# Patient Record
Sex: FEMALE | Race: BLACK OR AFRICAN AMERICAN | NOT HISPANIC OR LATINO | Employment: FULL TIME | URBAN - METROPOLITAN AREA
[De-identification: names, ages, dates, MRNs, and addresses within clinical notes are randomized per-mention and may not be internally consistent; named-entity substitution may affect disease eponyms.]

---

## 2018-04-18 ENCOUNTER — HISTORICAL (OUTPATIENT)
Dept: ADMINISTRATIVE | Facility: HOSPITAL | Age: 38
End: 2018-04-18

## 2018-04-18 LAB
BASOPHILS NFR BLD MANUAL: 2 %
BILIRUB SERPL-MCNC: NEGATIVE MG/DL
BLOOD URINE, POC: NEGATIVE
BUN SERPL-MCNC: 6 MG/DL (ref 7–18)
CALCIUM SERPL-MCNC: 8.7 MG/DL (ref 8.5–10.1)
CHLORIDE SERPL-SCNC: 105 MMOL/L (ref 98–107)
CLARITY, POC UA: CLEAR
CO2 SERPL-SCNC: 26 MMOL/L (ref 21–32)
COLOR, POC UA: YELLOW
CREAT SERPL-MCNC: 0.4 MG/DL (ref 0.6–1.3)
CREAT/UREA NIT SERPL: 15
EOSINOPHIL NFR BLD MANUAL: 1 %
ERYTHROCYTE [DISTWIDTH] IN BLOOD BY AUTOMATED COUNT: 12.3 % (ref 11.5–14.5)
GLUCOSE SERPL-MCNC: 89 MG/DL (ref 74–106)
GLUCOSE UR QL STRIP: NEGATIVE
GRANULOCYTES NFR BLD MANUAL: 63 % (ref 43–75)
HBV SURFACE AG SERPL QL IA: NEGATIVE
HCT VFR BLD AUTO: 36.8 % (ref 35–46)
HCV AB SERPL QL IA: NONREACTIVE
HGB BLD-MCNC: 12.5 GM/DL (ref 12–16)
HIV 1+2 AB+HIV1 P24 AG SERPL QL IA: NONREACTIVE
KETONES UR QL STRIP: NEGATIVE
LEUKOCYTE EST, POC UA: NEGATIVE
LYMPHOCYTES NFR BLD MANUAL: 17 % (ref 20.5–51.1)
LYMPHOCYTES NFR BLD MANUAL: 2 %
MCH RBC QN AUTO: 29.5 PG (ref 26–34)
MCHC RBC AUTO-ENTMCNC: 34 GM/DL (ref 31–37)
MCV RBC AUTO: 86.8 FL (ref 80–100)
METAMYELOCYTES NFR BLD MANUAL: 3 %
MONOCYTES NFR BLD MANUAL: 7 % (ref 2–9)
NEUTS BAND NFR BLD MANUAL: 5 % (ref 0–10)
NITRITE, POC UA: NEGATIVE
PH, POC UA: 7
PLATELET # BLD AUTO: 366 X10(3)/MCL (ref 130–400)
PLATELET # BLD EST: ADEQUATE 10*3/UL
PMV BLD AUTO: 10.1 FL (ref 7.4–10.4)
POLYCHROMASIA BLD QL SMEAR: ABNORMAL
POTASSIUM SERPL-SCNC: 3.9 MMOL/L (ref 3.5–5.1)
PROTEIN, POC: NEGATIVE
RBC # BLD AUTO: 4.24 X10(6)/MCL (ref 4–5.2)
RBC MORPH BLD: ABNORMAL
SODIUM SERPL-SCNC: 140 MMOL/L (ref 136–145)
SPECIFIC GRAVITY, POC UA: 1.01
T PALLIDUM AB SER QL: NONREACTIVE
UROBILINOGEN, POC UA: NORMAL
WBC # SPEC AUTO: 18.3 X10(3)/MCL (ref 4.5–11)

## 2018-08-05 ENCOUNTER — HOSPITAL ENCOUNTER (OUTPATIENT)
Dept: OBSTETRICS AND GYNECOLOGY | Facility: HOSPITAL | Age: 38
End: 2018-08-05
Attending: OBSTETRICS & GYNECOLOGY | Admitting: OBSTETRICS & GYNECOLOGY

## 2022-04-10 ENCOUNTER — HISTORICAL (OUTPATIENT)
Dept: ADMINISTRATIVE | Facility: HOSPITAL | Age: 42
End: 2022-04-10
Payer: MEDICAID

## 2022-04-24 VITALS
BODY MASS INDEX: 21.51 KG/M2 | SYSTOLIC BLOOD PRESSURE: 112 MMHG | DIASTOLIC BLOOD PRESSURE: 76 MMHG | HEIGHT: 62 IN | OXYGEN SATURATION: 98 % | WEIGHT: 116.88 LBS

## 2022-10-11 ENCOUNTER — OFFICE VISIT (OUTPATIENT)
Dept: FAMILY MEDICINE | Facility: CLINIC | Age: 42
End: 2022-10-11
Payer: MEDICAID

## 2022-10-11 VITALS
BODY MASS INDEX: 24.75 KG/M2 | WEIGHT: 136.19 LBS | OXYGEN SATURATION: 100 % | DIASTOLIC BLOOD PRESSURE: 65 MMHG | SYSTOLIC BLOOD PRESSURE: 102 MMHG | RESPIRATION RATE: 18 BRPM | HEART RATE: 102 BPM

## 2022-10-11 DIAGNOSIS — Z30.46 ENCOUNTER FOR NEXPLANON REMOVAL: Primary | ICD-10-CM

## 2022-10-11 DIAGNOSIS — N92.6 IRREGULAR BLEEDING: ICD-10-CM

## 2022-10-11 DIAGNOSIS — Z12.39 ENCOUNTER FOR SCREENING FOR MALIGNANT NEOPLASM OF BREAST, UNSPECIFIED SCREENING MODALITY: ICD-10-CM

## 2022-10-11 DIAGNOSIS — Z00.00 HEALTH CARE MAINTENANCE: ICD-10-CM

## 2022-10-11 PROBLEM — N93.9 ABNORMAL UTERINE BLEEDING: Status: ACTIVE | Noted: 2022-09-23

## 2022-10-11 LAB
ALBUMIN SERPL-MCNC: 3.9 GM/DL (ref 3.5–5)
ALBUMIN/GLOB SERPL: 1 RATIO (ref 1.1–2)
ALP SERPL-CCNC: 74 UNIT/L (ref 40–150)
ALT SERPL-CCNC: 28 UNIT/L (ref 0–55)
AST SERPL-CCNC: 19 UNIT/L (ref 5–34)
B-HCG UR QL: NEGATIVE
BASOPHILS # BLD AUTO: 0.1 X10(3)/MCL (ref 0–0.2)
BASOPHILS NFR BLD AUTO: 1 %
BILIRUBIN DIRECT+TOT PNL SERPL-MCNC: 0.4 MG/DL
BUN SERPL-MCNC: 12.9 MG/DL (ref 7–18.7)
CALCIUM SERPL-MCNC: 9.2 MG/DL (ref 8.4–10.2)
CHLORIDE SERPL-SCNC: 107 MMOL/L (ref 98–107)
CHOLEST SERPL-MCNC: 130 MG/DL
CHOLEST/HDLC SERPL: 3 {RATIO} (ref 0–5)
CO2 SERPL-SCNC: 28 MMOL/L (ref 22–29)
CREAT SERPL-MCNC: 0.81 MG/DL (ref 0.55–1.02)
CTP QC/QA: YES
EOSINOPHIL # BLD AUTO: 0.35 X10(3)/MCL (ref 0–0.9)
EOSINOPHIL NFR BLD AUTO: 3.6 %
ERYTHROCYTE [DISTWIDTH] IN BLOOD BY AUTOMATED COUNT: 11.9 % (ref 11.5–17)
EST. AVERAGE GLUCOSE BLD GHB EST-MCNC: 93.9 MG/DL
GFR SERPLBLD CREATININE-BSD FMLA CKD-EPI: >60 MLS/MIN/1.73/M2
GLOBULIN SER-MCNC: 3.8 GM/DL (ref 2.4–3.5)
GLUCOSE SERPL-MCNC: 89 MG/DL (ref 74–100)
HBA1C MFR BLD: 4.9 %
HCT VFR BLD AUTO: 43 % (ref 37–47)
HDLC SERPL-MCNC: 43 MG/DL (ref 35–60)
HGB BLD-MCNC: 14.8 GM/DL (ref 12–16)
IMM GRANULOCYTES # BLD AUTO: 0.04 X10(3)/MCL (ref 0–0.04)
IMM GRANULOCYTES NFR BLD AUTO: 0.4 %
LDLC SERPL CALC-MCNC: 73 MG/DL (ref 50–140)
LYMPHOCYTES # BLD AUTO: 3.55 X10(3)/MCL (ref 0.6–4.6)
LYMPHOCYTES NFR BLD AUTO: 36.4 %
MCH RBC QN AUTO: 30.2 PG (ref 27–31)
MCHC RBC AUTO-ENTMCNC: 34.4 MG/DL (ref 33–36)
MCV RBC AUTO: 87.8 FL (ref 80–94)
MONOCYTES # BLD AUTO: 1.07 X10(3)/MCL (ref 0.1–1.3)
MONOCYTES NFR BLD AUTO: 11 %
NEUTROPHILS # BLD AUTO: 4.6 X10(3)/MCL (ref 2.1–9.2)
NEUTROPHILS NFR BLD AUTO: 47.6 %
NRBC BLD AUTO-RTO: 0 %
PLATELET # BLD AUTO: 420 X10(3)/MCL (ref 130–400)
PMV BLD AUTO: 9.7 FL (ref 7.4–10.4)
POTASSIUM SERPL-SCNC: 3.8 MMOL/L (ref 3.5–5.1)
PROT SERPL-MCNC: 7.7 GM/DL (ref 6.4–8.3)
RBC # BLD AUTO: 4.9 X10(6)/MCL (ref 4.2–5.4)
SODIUM SERPL-SCNC: 142 MMOL/L (ref 136–145)
TRIGL SERPL-MCNC: 68 MG/DL (ref 37–140)
TSH SERPL-ACNC: 0.89 UIU/ML (ref 0.35–4.94)
VLDLC SERPL CALC-MCNC: 14 MG/DL
WBC # SPEC AUTO: 9.8 X10(3)/MCL (ref 4.5–11.5)

## 2022-10-11 PROCEDURE — 84443 ASSAY THYROID STIM HORMONE: CPT

## 2022-10-11 PROCEDURE — 90471 IMMUNIZATION ADMIN: CPT | Mod: PBBFAC

## 2022-10-11 PROCEDURE — 11982 REMOVE DRUG IMPLANT DEVICE: CPT | Mod: PBBFAC | Performed by: STUDENT IN AN ORGANIZED HEALTH CARE EDUCATION/TRAINING PROGRAM

## 2022-10-11 PROCEDURE — 83036 HEMOGLOBIN GLYCOSYLATED A1C: CPT

## 2022-10-11 PROCEDURE — 80053 COMPREHEN METABOLIC PANEL: CPT

## 2022-10-11 PROCEDURE — 80061 LIPID PANEL: CPT

## 2022-10-11 PROCEDURE — 36415 COLL VENOUS BLD VENIPUNCTURE: CPT

## 2022-10-11 PROCEDURE — 85025 COMPLETE CBC W/AUTO DIFF WBC: CPT

## 2022-10-11 PROCEDURE — 81025 URINE PREGNANCY TEST: CPT | Mod: PBBFAC

## 2022-10-11 PROCEDURE — 99213 OFFICE O/P EST LOW 20 MIN: CPT | Mod: PBBFAC

## 2022-10-11 RX ORDER — LIDOCAINE HYDROCHLORIDE 10 MG/ML
5 INJECTION, SOLUTION EPIDURAL; INFILTRATION; INTRACAUDAL; PERINEURAL
Status: ACTIVE | OUTPATIENT
Start: 2022-10-11

## 2022-10-11 NOTE — PROGRESS NOTES
[facility] Visit Note  [facility]      Subjective:      Yas Soto is a 42 y.o. female who is presenting for Nexplanon removal.  Pt has had irregular bleeding for approx 1-2 years.     Would like removal of Nexplanon 2/2 should've been removed in 10/21 and has been having irregular bleeding for approx 1-2 years w/ Nexplanon. Irregular bleeding has become worse of last couple of months. She thought she would not have periods with Nexplanon, but initially had light bleeding q28-30 when implant initially placed.  Has had bleeding approx lasting 2-3 weeks and recurring 1-2 weeks afterwards.  LMP began 3 days ago. Using approx 4 regular pads per day. Denies any bleeding through/soiling clothing. Had fist period at 16, q28-30 days, 5 days, normal flow. Had Nexplanon in 2018, used Depo previously w/o issue. Not sexually active since 2019. Denies known abnormal pap smear. Denies known STIs/STDs.  Denies vaginal discharge or dysuria. Denies hot flashes, bowel changes, dry skin, vaginal dryness, or mood changes. + mild intermittent anxiety over last couple months w/o increased stressors. Denies hx of anxiety in past, no open to CBT at this time.  Denies hx of HTN or Diabetes.  Denies known hx of Fibroids. Denies tobacco use.  Denies hx of malignancy. Father w/ Lymphoma, but denies any other first degree relatives w/ Colon, Endometrial or Ovarian Cancer. Reports unkwn relative of Father's family w/ breast cancer.     Review of Systems: As noted in HPI.     No past medical history on file.    No past surgical history on file.    No family history on file.    Social History     Socioeconomic History    Marital status: Single   Tobacco Use    Smoking status: Never    Smokeless tobacco: Never       No current outpatient medications on file.     No current facility-administered medications for this visit.       Review of patient's allergies indicates:  No Known Allergies    Objective:   /65   Pulse 102   Resp 18    Wt 61.8 kg (136 lb 3.2 oz)   SpO2 100%   BMI 24.75 kg/m²      Physical Exam   Cardiovascular: Normal rate. Pulmonary:      Effort: Pulmonary effort is normal.     Abdominal: Soft. She exhibits no distension and no mass. There is no abdominal tenderness.   Musculoskeletal:      Comments: Nexplanon noted in L lateral arm, palpated. Full device removed. See procedure note.    Neurological: She is alert.   Skin: Skin is warm and dry. Capillary refill takes less than 2 seconds.      Nexplanon Removal Procedure Note  PRE-OP DIAGNOSIS: Nexplanon, desire for change of contraception   POST-OP DIAGNOSIS: Same   PROCEDURE: Nexplanon Removal   Performing Physician: Dr. Gomez   PROCEDURE:   Anesthesia: 1% Lidocaine w/o epinephrine 5 ml   Procedure: Consent obtained. A time-out was performed prior to  initiating procedure to be sure of right patient and right location. The  area surrounding the Nexplanon was prepared with Choloraprep and draped  in the usual sterile manner. The site was anesthetized with lidocaine.  A skin incision was made over the distal aspect of the device. The  capsule lysed sharply and the device removed using a hemostat.  Hemostasis was assured. The site was dressed with bandaid and a pressure dressing. The patient tolerated the procedure  well.     Followup: The patient tolerated the procedure well without  complications. Standard post-procedure care is explained and return  precautions are given. Contraception is advised until conception is  desired.        Assessment:   42 y.o. with        1. Encounter for Nexplanon removal    2. Irregular bleeding    3. Encounter for screening for malignant neoplasm of breast, unspecified screening modality    4. Health care maintenance         Plan:     Orders Placed This Encounter    Mammo Digital Screening Bilat w/ Juaquin    Influenza - Quadrivalent *Preferred* (6 months+) (PF)    TSH    CBC Auto Differential    Comprehensive Metabolic Panel    Lipid Panel     Hemoglobin A1C    CBC with Differential    POCT Urine Pregnancy    LIDOcaine (PF) 10 mg/ml (1%) injection 50 mg     -Nexplanon removal completed in clinic. Pt tolerated well. Remove bandage in 48 hours or sooner PRN. RTC/ED precautions given.     -Will see if pt's irregular bleeding improves s/p Nexplanon removal.  Pt may need acute intervention to stop abnormal bleeding (I.e Provera) pending course and further AUB w/u. PRN NSAIDs for pain/discomfort. RTC/ED precautions given.    -Plan for  pelvic exam, STI/STD testing, and +/- repeat pap smear at next visit in setting of irregular bleeding. UPT negative today. Ordered TSH and CBC today. Pt w/o risk factors for endometrial cancer and no known hx of structural disorders or ovulation issues. May need Pelvic US pending course.     -Pt reports mild intermittent anxiety. Ordered TSH. Offered CBT but  not interested at this time. Complete formal w/u for anxiety at next appt.     -Pt w/o care w/ PCP for years. Ordered routine labs as noted above..     -Influenza immunization given today.     -Placed pt MMG order fo breast cancer screening.     The patient's diagnosis and medications were discussed.      Marlene Gomez MD   [facility] PGY-III  10/11/2022

## 2022-10-12 NOTE — PROGRESS NOTES
I DISCUSSED WITH THE RESIDENT AND AGREE WITH THE RESIDENT'S FINDING AND CARE PLAN AS DOCUMENTED IN THE RESIDENT'S NOTE